# Patient Record
Sex: MALE | Race: WHITE | Employment: OTHER | URBAN - METROPOLITAN AREA
[De-identification: names, ages, dates, MRNs, and addresses within clinical notes are randomized per-mention and may not be internally consistent; named-entity substitution may affect disease eponyms.]

---

## 2018-10-12 RX ORDER — POTASSIUM CHLORIDE 750 MG/1
10 TABLET, EXTENDED RELEASE ORAL 2 TIMES DAILY
Qty: 90 TAB | Refills: 0 | OUTPATIENT
Start: 2018-10-12

## 2018-10-12 NOTE — TELEPHONE ENCOUNTER
[facility] .  No lab > 1 year, Hema Murphy

## 2018-12-12 ENCOUNTER — OFFICE VISIT (OUTPATIENT)
Dept: FAMILY MEDICINE CLINIC | Age: 78
End: 2018-12-12

## 2018-12-12 VITALS
DIASTOLIC BLOOD PRESSURE: 80 MMHG | HEART RATE: 63 BPM | OXYGEN SATURATION: 99 % | HEIGHT: 74 IN | SYSTOLIC BLOOD PRESSURE: 147 MMHG | BODY MASS INDEX: 27.72 KG/M2 | WEIGHT: 216 LBS | TEMPERATURE: 97.7 F | RESPIRATION RATE: 16 BRPM

## 2018-12-12 DIAGNOSIS — E78.00 HYPERCHOLESTEREMIA: Primary | ICD-10-CM

## 2018-12-12 DIAGNOSIS — N18.9 CHRONIC KIDNEY DISEASE, UNSPECIFIED CKD STAGE: ICD-10-CM

## 2018-12-12 RX ORDER — TADALAFIL 5 MG/1
2.5 TABLET ORAL
COMMUNITY
End: 2018-12-12

## 2018-12-12 RX ORDER — DILTIAZEM HYDROCHLORIDE 180 MG/1
CAPSULE, COATED, EXTENDED RELEASE ORAL
Refills: 2 | COMMUNITY
Start: 2018-10-27 | End: 2020-08-24

## 2018-12-12 RX ORDER — CIPROFLOXACIN 500 MG/1
TABLET ORAL
COMMUNITY
Start: 2018-05-28 | End: 2018-12-12

## 2018-12-12 RX ORDER — RIVAROXABAN 20 MG/1
TABLET, FILM COATED ORAL
Refills: 7 | COMMUNITY
Start: 2018-11-22

## 2018-12-12 RX ORDER — SILDENAFIL 50 MG/1
50 TABLET, FILM COATED ORAL AS NEEDED
COMMUNITY
End: 2019-03-25 | Stop reason: DRUGHIGH

## 2018-12-12 RX ORDER — POTASSIUM CHLORIDE 750 MG/1
CAPSULE, EXTENDED RELEASE ORAL
Refills: 0 | COMMUNITY
Start: 2018-10-16 | End: 2018-12-12 | Stop reason: SDUPTHER

## 2018-12-12 RX ORDER — MELOXICAM 15 MG/1
TABLET ORAL
Refills: 1 | COMMUNITY
Start: 2018-10-11 | End: 2019-03-25 | Stop reason: ALTCHOICE

## 2018-12-12 RX ORDER — TAMSULOSIN HYDROCHLORIDE 0.4 MG/1
0.8 CAPSULE ORAL DAILY
COMMUNITY
End: 2018-12-12

## 2018-12-12 RX ORDER — POTASSIUM CHLORIDE 750 MG/1
10 CAPSULE, EXTENDED RELEASE ORAL 2 TIMES DAILY
Qty: 180 CAP | Refills: 1 | Status: SHIPPED | OUTPATIENT
Start: 2018-12-12

## 2018-12-12 RX ORDER — SIMVASTATIN 10 MG/1
TABLET, FILM COATED ORAL
Qty: 90 TAB | Refills: 1 | Status: SHIPPED | OUTPATIENT
Start: 2018-12-12 | End: 2019-02-24 | Stop reason: SDUPTHER

## 2018-12-12 RX ORDER — UREA 10 %
50 LOTION (ML) TOPICAL
COMMUNITY
End: 2020-08-24

## 2018-12-12 RX ORDER — SIMVASTATIN 10 MG/1
TABLET, FILM COATED ORAL
Refills: 0 | COMMUNITY
Start: 2018-11-10 | End: 2018-12-12 | Stop reason: SDUPTHER

## 2018-12-12 RX ORDER — PNEUMOCOCCAL 13-VALENT CONJUGATE VACCINE 2.2; 2.2; 2.2; 2.2; 2.2; 4.4; 2.2; 2.2; 2.2; 2.2; 2.2; 2.2; 2.2 UG/.5ML; UG/.5ML; UG/.5ML; UG/.5ML; UG/.5ML; UG/.5ML; UG/.5ML; UG/.5ML; UG/.5ML; UG/.5ML; UG/.5ML; UG/.5ML; UG/.5ML
INJECTION, SUSPENSION INTRAMUSCULAR
Refills: 0 | COMMUNITY
Start: 2018-09-18 | End: 2020-08-24

## 2018-12-12 RX ORDER — CHLORPHENIRAMINE MALEATE 4 MG
TABLET ORAL
COMMUNITY
End: 2020-08-24

## 2018-12-12 NOTE — PROGRESS NOTES
[address] [facility] [address] [facility], [address] [place] [address]  Dr. Kevon Jara. [place] Covert  Phone:  [phone]  Fax:  [phone]    Progress Note    Name:  Chico Deras  Age:  69 y.o.  :  1940  Encounter Date:  2018    Primary Care Provider:  De Delgado MD    Chief Complaint   Patient presents with    Medication Refill     needs lab work for potassium to get refilled, patient states he has been taking otc potassium since he had run out of his prescription      HPI:  Patient here to follow-up potassium and determine medication refill and adjustments and appropriate lab evaluation. Patient is compliant with medications and no new side effects.        Past Medical History:   Diagnosis Date    A-fib [facility])     controlled with meds    Asbestosis ([address]     Bladder stone     BPH (benign prostatic hyperplasia)     Chronic kidney disease     stage 3    Diverticulosis     Gallstone     HTN (hypertension)     Hyperlipidemia     Lipoma     Osteoarthritis     Pes planus     Renal stones      Past Surgical History:   Procedure Laterality Date    HX COLONOSCOPY      sigmoid diverticulosis, repeat     HX HERNIA REPAIR      HX ORTHOPAEDIC      re-attachment of right middle finger post injury      Family History   Problem Relation Age of Onset    Coronary Artery Disease Mother     Heart Attack Mother     Prostate Cancer Father     Alcohol abuse Brother     Other Brother         colonic polyps     Social History     Socioeconomic History    Marital status:      Spouse name: Not on file    Number of children: Not on file    Years of education: Not on file    Highest education level: Not on file   Tobacco Use    Smoking status: Former Smoker     Types: Cigarettes    Smokeless tobacco: Never Used    Tobacco comment: quit >35years ago   Substance and Sexual Activity    Alcohol use: Yes     Frequency: Monthly or less     Comment: socially    Drug use: No    Sexual activity: Yes     Partners: Female       Current Outpatient Medications   Medication Sig Dispense Refill    FLUZONE HIGH-DOSE 2018-19, PF, syrg injection inject 0.5 milliliter intramuscularly  0    meloxicam (MOBIC) 15 mg tablet   1    PREVNAR 13, PF, 0.5 mL syrg injection inject 0.5 milliliter intramuscularly  0    dilTIAZem CD (CARDIZEM CD) 180 mg ER capsule TK 1 C PO QD  2    XARELTO 20 mg tab tablet TK 1 T PO QD WITH QUINTIN MEAL  7    multivitamin (MULTIPLE VITAMINS PO) Take  by mouth.  omega-3 fatty acids-fish oil (FISH OIL) 360-1,200 mg cap Take  by mouth.  thiamine (B-1) 50 mg tablet Take 50 mg by mouth.  sildenafil citrate (VIAGRA) 50 mg tablet Take 50 mg by mouth as needed.  potassium chloride SA (MICRO-K) 10 mEq capsule Take 1 Cap by mouth two (2) times a day. 180 Cap 1    simvastatin (ZOCOR) 10 mg tablet TK 1 T PO QHS 90 Tab 1     No Known Allergies  Patient Active Problem List   Diagnosis Code    HTN (hypertension) I10       Review of Systems   Constitutional: Negative for fever. Respiratory: Negative for shortness of breath. Cardiovascular: Negative for chest pain, orthopnea and PND. Gastrointestinal: Negative for abdominal pain, nausea and vomiting. Visit Vitals  /80 (BP 1 Location: Left arm, BP Patient Position: Sitting)   Pulse 63   Temp 97.7 °F (36.5 °C) (Oral)   Resp 16   Ht 6' 2\" (1.88 m)   Wt 216 lb (98 kg)   SpO2 99%   BMI 27.73 kg/m²     Physical Exam   Constitutional: He is oriented to person, place, and time and well-developed, well-nourished, and in no distress. HENT:   Head: Normocephalic. Eyes: Pupils are equal, round, and reactive to light. Neck: Normal range of motion. Cardiovascular: Normal rate and regular rhythm. Pulmonary/Chest: Effort normal and breath sounds normal.   Abdominal: Soft. Neurological: He is alert and oriented to person, place, and time. Skin: Skin is warm and dry.        Labs/Radiology Reviewed    Assessment/Plan:    ICD-10-CM ICD-9-CM    1. Hypercholesteremia E78.00 272.0 simvastatin (ZOCOR) 10 mg tablet   2. Chronic kidney disease, unspecified CKD stage N18.9 585.9 potassium chloride SA (MICRO-K) 10 mEq capsule      METABOLIC PANEL, BASIC       Orders Placed This Encounter    METABOLIC PANEL, BASIC    potassium chloride SA (MICRO-K) 10 mEq capsule    simvastatin (ZOCOR) 10 mg tablet       Follow-up Disposition:  Return in about 3 months (around 3/12/2019) for annual exam with lab work.       Madi Og MD  12/12/2018

## 2018-12-12 NOTE — PROGRESS NOTES
Identified pt with two pt identifiers(name and ). Chief Complaint   Patient presents with    Medication Refill     needs lab work for potassium to get refilled         Health Maintenance Due   Topic    DTaP/Tdap/Td series (1 - Tdap)    Shingrix Vaccine Age 50> (1 of 2)    GLAUCOMA SCREENING Q2Y     Pneumococcal 65+ Low/Medium Risk (1 of 2 - PCV13)    MEDICARE YEARLY EXAM     Influenza Age 5 to Adult        Wt Readings from Last 3 Encounters:   18 216 lb (98 kg)     Temp Readings from Last 3 Encounters:   No data found for Temp     BP Readings from Last 3 Encounters:   No data found for BP     Pulse Readings from Last 3 Encounters:   No data found for Pulse         Learning Assessment:  :     No flowsheet data found. Depression Screening:  :     No flowsheet data found. Fall Risk Assessment:  :     No flowsheet data found. Abuse Screening:  :     No flowsheet data found. Coordination of Care Questionnaire:  :     1) Have you been to an emergency room, urgent care clinic since your last visit? no   Hospitalized since your last visit? no             2) Have you seen or consulted any other health care providers outside of [address] [place] [address] since your last visit? yes 30 day supply of potassium. saw a doctor in [place] to get  (Include any pap smears or colon screenings in this section.)    3) Do you have an Advance Directive on file? no  Are you interested in receiving information about Advance Directives? no    Patient is accompanied by self I have received verbal consent from Patricia Braswell to discuss any/all medical information while they are present in the room. Reviewed record in preparation for visit and have obtained necessary documentation. Medication reconciliation up to date and corrected with patient at this time.

## 2018-12-13 LAB
BUN SERPL-MCNC: 18 MG/DL (ref 8–27)
BUN/CREAT SERPL: 17 (ref 10–24)
CALCIUM SERPL-MCNC: 9.2 MG/DL (ref 8.6–10.2)
CHLORIDE SERPL-SCNC: 105 MMOL/L (ref 96–106)
CO2 SERPL-SCNC: 26 MMOL/L (ref 20–29)
CREAT SERPL-MCNC: 1.03 MG/DL (ref 0.76–1.27)
GLUCOSE SERPL-MCNC: 109 MG/DL (ref 65–99)
POTASSIUM SERPL-SCNC: 3.7 MMOL/L (ref 3.5–5.2)
SODIUM SERPL-SCNC: 148 MMOL/L (ref 134–144)

## 2019-03-25 ENCOUNTER — OFFICE VISIT (OUTPATIENT)
Dept: FAMILY MEDICINE CLINIC | Age: 79
End: 2019-03-25

## 2019-03-25 VITALS
HEIGHT: 70 IN | DIASTOLIC BLOOD PRESSURE: 80 MMHG | RESPIRATION RATE: 16 BRPM | SYSTOLIC BLOOD PRESSURE: 118 MMHG | TEMPERATURE: 97.8 F | OXYGEN SATURATION: 96 % | BODY MASS INDEX: 29.92 KG/M2 | HEART RATE: 88 BPM | WEIGHT: 209 LBS

## 2019-03-25 DIAGNOSIS — Z00.00 ANNUAL PHYSICAL EXAM: Primary | ICD-10-CM

## 2019-03-25 DIAGNOSIS — N52.39 OTHER POST-PROCEDURAL ERECTILE DYSFUNCTION: ICD-10-CM

## 2019-03-25 PROBLEM — E78.5 DYSLIPIDEMIA: Status: ACTIVE | Noted: 2019-03-25

## 2019-03-25 PROBLEM — N52.9 ERECTILE DYSFUNCTION: Status: ACTIVE | Noted: 2019-03-25

## 2019-03-25 PROBLEM — I48.20 CHRONIC ATRIAL FIBRILLATION (HCC): Status: ACTIVE | Noted: 2019-03-25

## 2019-03-25 PROBLEM — N18.30 CKD (CHRONIC KIDNEY DISEASE) STAGE 3, GFR 30-59 ML/MIN (HCC): Status: ACTIVE | Noted: 2019-03-25

## 2019-03-25 PROBLEM — J61 ASBESTOSIS (HCC): Status: ACTIVE | Noted: 2019-03-25

## 2019-03-25 RX ORDER — SILDENAFIL CITRATE 20 MG/1
20 TABLET ORAL AS NEEDED
Qty: 90 TAB | Refills: 1 | Status: SHIPPED | OUTPATIENT
Start: 2019-03-25 | End: 2020-06-08

## 2019-03-25 NOTE — PROGRESS NOTES
Date of visit: 3/25/2019 This is a Subsequent Medicare Annual Wellness Visit (AWV), (Performed more than 12 months after effective date of Medicare Part B enrollment and 12 months after last preventive visit, Once in a lifetime) I have reviewed the patient's medical history in detail and updated the computerized patient record. Nilesh Scanlon is a 69 y.o. male History obtained from: the patient. Concerns today (Patient understands that medical problems addressed today may incur additional cost as this is a preventive visit) -No concerns. ED Patient states that he currently on Viagra. Takes it about once per month. Usually takes 3-4 20mg tablets as needed. Reports early morning erections. History Patient Active Problem List  
Diagnosis Code  
 HTN (hypertension) I10  
 Erectile dysfunction N52.9  Chronic atrial fibrillation (HCC) I48.2  Asbestosis ([address] J61  
 CKD (chronic kidney disease) stage 3, GFR 30-59 ml/min (HCC) N18.3  Dyslipidemia E78.5 Past Medical History:  
Diagnosis Date  A-fib ([address]   
 controlled with meds  Asbestosis ([address]  Bladder stone  BPH (benign prostatic hyperplasia)  Chronic kidney disease   
 stage 3  Diverticulosis  Gallstone  HTN (hypertension)  Hyperlipidemia  Lipoma  Osteoarthritis  Pes planus  Renal stones Past Surgical History:  
Procedure Laterality Date  HX COLONOSCOPY  2012  
 sigmoid diverticulosis, repeat 2022  HX HERNIA REPAIR    
 HX ORTHOPAEDIC    
 re-attachment of right middle finger post injury No Known Allergies Current Outpatient Medications Medication Sig Dispense Refill  pneumococcal 23-madison ps vaccine (PNEUMOVAX 23) 25 mcg/0.5 mL injection 0.5 mL by IntraMUSCular route once for 1 dose. 0.5 mL 0  
 varicella-zoster recombinant, PF, (SHINGRIX, PF,) 50 mcg/0.5 mL susr injection 0.5mL by IntraMUSCular route once now and then repeat in 2-6 months 0.5 mL 1  sildenafil, antihypertensive, (REVATIO) 20 mg tablet Take 1 Tab by mouth as needed for Other (ED). Take 1-5 tablets as needed 90 Tab 1  
 simvastatin (ZOCOR) 10 mg tablet TAKE 1 TABLET BY MOUTH EVERY NIGHT AT BEDTIME 90 Tab 1  
 FLUZONE HIGH-DOSE 2018-19, PF, syrg injection inject 0.5 milliliter intramuscularly  0  
 PREVNAR 13, PF, 0.5 mL syrg injection inject 0.5 milliliter intramuscularly  0  
 dilTIAZem CD (CARDIZEM CD) 180 mg ER capsule TK 1 C PO QD  2  
 XARELTO 20 mg tab tablet TK 1 T PO QD WITH QUINTIN MEAL  7  
 multivitamin (MULTIPLE VITAMINS PO) Take  by mouth.  omega-3 fatty acids-fish oil (FISH OIL) 360-1,200 mg cap Take  by mouth.  thiamine (B-1) 50 mg tablet Take 50 mg by mouth.  potassium chloride SA (MICRO-K) 10 mEq capsule Take 1 Cap by mouth two (2) times a day. 180 Cap 1 Family History Problem Relation Age of Onset  Coronary Artery Disease Mother  Heart Attack Mother  Prostate Cancer Father  Alcohol abuse Brother  Other Brother   
     colonic polyps Social History Tobacco Use  Smoking status: Former Smoker Types: Cigarettes  Smokeless tobacco: Never Used  Tobacco comment: quit >35years ago Substance Use Topics  Alcohol use: Yes Frequency: Monthly or less Comment: socially Specialists/Care Team  
Rikki Calvert. Ricky Tinoco has established care with the following healthcare providers: 
Patient Care Team: 
Leonila Parr MD as PCP - [facility]) Dr. Guicho Ford- Pulmonology Health Risk Assessment Demographics  
male 
69 y.o. General Health Questions  
-During the past 4 weeks: 
 -how would you rate your health in general? Good  
 -how often have you been bothered by feeling dizzy when standing up? never  -how much have you been bothered by bodily pain? mildly 
 -Have you noticed any hearing difficulties? no 
 -has your physical and emotional health limited your social activities with family or friends? no 
 
Emotional Health Questions  
-Do you have a history of depression, anxiety, or emotional problems? no 
-Over the past 2 weeks, have you felt down, depressed or hopeless? no 
-Over the past 2 weeks, have you felt little interest or pleasure in doing things? no 
 
Health Habits Please describe your diet habits: Trying to eat a well balanced diet. Do you get 5 servings of fruits or vegetables daily? yes Do you exercise regularly? Yes, plays golf daily. Activities of Daily Living and Functional Status  
-Do you need help with eating, walking, dressing, bathing, toileting, the phone, transportation, shopping, preparing meals, housework, laundry, medications or managing money? no 
-In the past four weeks, was someone available to help you if you needed and wanted help with anything? no 
-Are you confident are you that you can control and manage most of your health problems? yes 
-Have you been given information to help you keep track of your medications? yes 
-How often do you have trouble taking your medications as prescribed? never Fall Risk and Home Safety Have you fallen 2 or more times in the past year? no 
Does your home have rugs in the hallway, lack grab bars in the bathroom, lack handrails on the stairs or have poor lighting? no 
Do you have smoke detectors and check them regularly? yes Do you have difficulties driving a car? no 
Do you always fasten your seat belt when you are in a car? yes Review of Systems (if indicated for problems addressed today) ROS: (positive in bold) General: wt loss, fever, fatigue or appetite change Skin: rashes or suspicious skin lesions HEENT: changes in vision, dysphagia, congestion, sore throat Cardiac: chest pain, palpitations, CAREY,edema Pul: SOB, dyspnea GI: abdominal pain, N&V, diarrhea, constipation, hematemsis, melena,  
: hematuria, dysuria, freq, urgency, incontinence MS: joint pain, swelling, stiffness, myalgia, back pain Neuro: weakness, parasthesias, headache, syncope Psych: anxiety, depression Physical Examination Vitals:  
 03/25/19 1342 BP: 118/80 Pulse: 88 Resp: 16 Temp: 97.8 °F (36.6 °C) TempSrc: Oral  
SpO2: 96% Weight: 209 lb (94.8 kg) Height: 5' 10\" (1.778 m) Body mass index is 29.99 kg/m². No exam data present Gen:  Well developed, well nourished male in no acute distress HEENT: normocephalic/atraumatic; PERRL; TM intact, translucent, and neutral BL;  oropharynx shows no erythema or exudates Skin:  No rashes or suspicious skin lesions noted Neck:   Supple, no lympadenopathy, no thyromegaly Card:  RRR, no m/r/g Chest:  CTAB, no w/r/r Abd:  BS+, Soft, nontender/nondistended Extr:  2+ pulses BL, no LE edema MS:   full ROM, 5/5 strength BL, sensation intact Neuro: AAO X 3, CN II-XII grossly intact Psych:  Nl mood and affect Evaluation of Cognitive Function Mood/affect:  neutral 
Orientation: Person, Place, Time and Situation Appearance: age appropriate Advice/Referrals/Counseling (as indicated) Education and counseling provided for any problems identified above Preventive Services (Preventive care checklist to be included in patient instructions) Discussed today Done Previously Not Needed X X  Pneumococcal vaccines X X X Flu vaccine X X  Shingles vaccine X X  TDAP vaccine X X X PSA X 6/22/12 X Colorectal cancer screening X X quit 40 years ago X Low-dose CT for lung cancer screening X X X Glaucoma screening X X X Cholesterol test  
X X X Diabetes screening test   
 
Discussion of Advance Directive Discussed with Rikki Calvert. Ricky Devoid his ability to prepare and advance directive in the case that an injury or illness causes him to be unable to make health care decisions. Assessment/Plan ICD-10-CM ICD-9-CM 1.  Annual physical exam Z00.00 V70.0 pneumococcal 23-madison ps vaccine (PNEUMOVAX 23) 25 mcg/0.5 mL injection  
   varicella-zoster recombinant, PF, (SHINGRIX, PF,) 50 mcg/0.5 mL susr injection METABOLIC PANEL, BASIC  
   LIPID PANEL  
   HEMOGLOBIN A1C WITH EAG 2. Other post-procedural erectile dysfunction N52.39 607.84 sildenafil, antihypertensive, (REVATIO) 20 mg tablet Annual 
69 y.o. male who presents today for annual exam. UTD on screening. UTD on influenza and Prevnar 13. Shingrix and pneumovax vaccine provided to patient today. Will check routine labs. Encouraged daily exercise and trying to eat a well balanced diet. ED Stable. Well controlled on Sildenafil. Not on nitrates. Discussed risk and benefits of being on medication. Patient follows urology and cardiology. Will continue to monitor. I have discussed the diagnosis with the patient and the intended plan as seen in the above orders. The patient has received an after-visit summary and questions were answered concerning future plans. I have discussed medication side effects and warnings with the patient as well. The patient agrees and understands above plan. Follow-up and Dispositions · Return in about 1 year (around 3/25/2020). Patient discussed with supervising attending.   
 
Anthony Turner,

## 2019-03-25 NOTE — PROGRESS NOTES
Identified pt with two pt identifiers(name and ). Chief Complaint Patient presents with [facility] Annual Wellness Visit [facility] wellness exam  Health Maintenance Due Topic  DTaP/Tdap/Td series (1 - Tdap)  Shingrix Vaccine Age 50> (1 of 2)  GLAUCOMA SCREENING Q2Y  Pneumococcal 65+ years (1 of 2 - PCV13)  MEDICARE YEARLY EXAM   
 Influenza Age 5 to Adult Wt Readings from Last 3 Encounters:  
19 209 lb (94.8 kg) 18 216 lb (98 kg) Temp Readings from Last 3 Encounters:  
18 97.7 °F (36.5 °C) (Oral) BP Readings from Last 3 Encounters:  
18 147/80 Pulse Readings from Last 3 Encounters:  
18 63 Learning Assessment: 
:  
 
No flowsheet data found. Depression Screening: 
:  
 
3 most recent PHQ Screens 3/25/2019 Little interest or pleasure in doing things Not at all Feeling down, depressed, irritable, or hopeless Not at all Total Score PHQ 2 0 Fall Risk Assessment: 
:  
 
Fall Risk Assessment, last 12 mths 3/25/2019 Able to walk? Yes Fall in past 12 months? No  
 
 
Abuse Screening: 
:  
 
No flowsheet data found. Coordination of Care Questionnaire: 
:  
 
1) Have you been to an emergency room, urgent care clinic since your last visit? no  
Hospitalized since your last visit? no          
 
2) Have you seen or consulted any other health care providers outside of [address] [place] [address] since your last visit? no  (Include any pap smears or colon screenings in this section.) 3) Do you have an Advance Directive on file? no 
Are you interested in receiving information about Advance Directives? no 
 
Patient is accompanied by self I have received verbal consent from [facility] to discuss any/all medical information while they are present in the room. Reviewed record in preparation for visit and have obtained necessary documentation.  
Medication reconciliation up to date and corrected with patient at this time.

## 2019-03-25 NOTE — PATIENT INSTRUCTIONS
Medicare Wellness Visit, Male The best way to live healthy is to have a lifestyle where you eat a well-balanced diet, exercise regularly, limit alcohol use, and quit all forms of tobacco/nicotine, if applicable. Regular preventive services are another way to keep healthy. Preventive services (vaccines, screening tests, monitoring & exams) can help personalize your care plan, which helps you manage your own care. Screening tests can find health problems at the earliest stages, when they are easiest to treat. [facility] follows the current, evidence-based guidelines published by the [facility] Greg Manuela ([facility]) when recommending preventive services for our patients. Because we follow these guidelines, sometimes recommendations change over time as research supports it. (For example, a prostate screening blood test is no longer routinely recommended for men with no symptoms.) Of course, you and your doctor may decide to screen more often for some diseases, based on your risk and co-morbidities (chronic disease you are already diagnosed with). Preventive services for you include: - Medicare offers their members a free annual wellness visit, which is time for you and your primary care provider to discuss and plan for your preventive service needs. Take advantage of this benefit every year! 
-All adults over age 72 should receive the recommended pneumonia vaccines. Current USPSTF guidelines recommend a series of two vaccines for the best pneumonia protection.  
-All adults should have a flu vaccine yearly and an ECG.  All adults age 61 and older should receive a shingles vaccine once in their lifetime.   
-All adults age 38-68 who are overweight should have a diabetes screening test once every three years.  
-Other screening tests & preventive services for persons with diabetes include: an eye exam to screen for diabetic retinopathy, a kidney function test, a foot exam, and stricter control over your cholesterol.  
-Cardiovascular screening for adults with routine risk involves an electrocardiogram (ECG) at intervals determined by the provider.  
-Colorectal cancer screening should be done for adults age 54-65 with no increased risk factors for colorectal cancer. There are a number of acceptable methods of screening for this type of cancer. Each test has its own benefits and drawbacks. Discuss with your provider what is most appropriate for you during your annual wellness visit. The different tests include: colonoscopy (considered the best screening method), a fecal occult blood test, a fecal DNA test, and sigmoidoscopy. 
-All adults born between [facility] should be screened once for Hepatitis C. 
-An Abdominal Aortic Aneurysm (AAA) Screening is recommended for men age 73-68 who has ever smoked in their lifetime.

## 2019-03-27 LAB
BUN SERPL-MCNC: 20 MG/DL (ref 8–27)
BUN/CREAT SERPL: 17 (ref 10–24)
CALCIUM SERPL-MCNC: 9.3 MG/DL (ref 8.6–10.2)
CHLORIDE SERPL-SCNC: 102 MMOL/L (ref 96–106)
CHOLEST SERPL-MCNC: 106 MG/DL (ref 100–199)
CO2 SERPL-SCNC: 25 MMOL/L (ref 20–29)
CREAT SERPL-MCNC: 1.21 MG/DL (ref 0.76–1.27)
EST. AVERAGE GLUCOSE BLD GHB EST-MCNC: 105 MG/DL
GLUCOSE SERPL-MCNC: 98 MG/DL (ref 65–99)
HBA1C MFR BLD: 5.3 % (ref 4.8–5.6)
HDLC SERPL-MCNC: 33 MG/DL
LDLC SERPL CALC-MCNC: 57 MG/DL (ref 0–99)
POTASSIUM SERPL-SCNC: 3.7 MMOL/L (ref 3.5–5.2)
SODIUM SERPL-SCNC: 144 MMOL/L (ref 134–144)
TRIGL SERPL-MCNC: 79 MG/DL (ref 0–149)
VLDLC SERPL CALC-MCNC: 16 MG/DL (ref 5–40)

## 2019-08-26 DIAGNOSIS — E78.00 HYPERCHOLESTEREMIA: ICD-10-CM

## 2019-08-26 RX ORDER — SIMVASTATIN 10 MG/1
TABLET, FILM COATED ORAL
Qty: 90 TAB | Refills: 1 | Status: SHIPPED | OUTPATIENT
Start: 2019-08-26 | End: 2020-02-25

## 2020-05-28 DIAGNOSIS — E78.00 HYPERCHOLESTEREMIA: ICD-10-CM

## 2020-05-28 RX ORDER — SIMVASTATIN 10 MG/1
TABLET, FILM COATED ORAL
Qty: 90 TAB | Refills: 0 | Status: SHIPPED | OUTPATIENT
Start: 2020-05-28 | End: 2020-06-08 | Stop reason: SDUPTHER

## 2020-06-08 ENCOUNTER — VIRTUAL VISIT (OUTPATIENT)
Dept: FAMILY MEDICINE CLINIC | Age: 80
End: 2020-06-08

## 2020-06-08 DIAGNOSIS — L30.9 DERMATITIS: Primary | ICD-10-CM

## 2020-06-08 DIAGNOSIS — E78.00 HYPERCHOLESTEREMIA: ICD-10-CM

## 2020-06-08 RX ORDER — BETAMETHASONE DIPROPIONATE 0.5 MG/G
CREAM TOPICAL 2 TIMES DAILY
Qty: 15 G | Refills: 2 | Status: SHIPPED | OUTPATIENT
Start: 2020-06-08 | End: 2020-08-24 | Stop reason: SDUPTHER

## 2020-06-08 RX ORDER — VITAMIN E 1000 UNIT
1000 CAPSULE ORAL
COMMUNITY

## 2020-06-08 RX ORDER — AMLODIPINE BESYLATE 5 MG/1
TABLET ORAL
COMMUNITY
Start: 2020-05-16

## 2020-06-08 RX ORDER — TADALAFIL 10 MG/1
5 TABLET ORAL AS NEEDED
Qty: 30 TAB | Refills: 3 | Status: SHIPPED | OUTPATIENT
Start: 2020-06-08 | End: 2020-08-24

## 2020-06-08 RX ORDER — SIMVASTATIN 10 MG/1
TABLET, FILM COATED ORAL
Qty: 90 TAB | Refills: 3 | Status: SHIPPED | OUTPATIENT
Start: 2020-06-08 | End: 2020-08-24 | Stop reason: SDUPTHER

## 2020-06-08 RX ORDER — KETOCONAZOLE 20 MG/G
CREAM TOPICAL
COMMUNITY
Start: 2020-04-23

## 2020-06-08 RX ORDER — AMIODARONE HYDROCHLORIDE 200 MG/1
TABLET ORAL
COMMUNITY
Start: 2020-04-11

## 2020-06-08 NOTE — PROGRESS NOTES
Identified pt with two pt identifiers(name and ). Reviewed record in preparation for visit and have obtained necessary documentation. Chief Complaint   Patient presents with    Medication Refill        Health Maintenance Due   Topic    DTaP/Tdap/Td series (1 - Tdap)    GLAUCOMA SCREENING Q2Y     Pneumococcal 65+ years (2 of 2 - PPSV23)    Shingrix Vaccine Age 49> (2 of 2)    Medicare Yearly Exam     Lipid Screen        Coordination of Care Questionnaire:  :   1) Have you been to an emergency room, urgent care, or hospitalized since your last visit? If yes, where when, and reason for visit? no      2. Have seen or consulted any other health care provider since your last visit? If yes, where when, and reason for visit?   no        Patient is accompanied by self  I have received verbal consent from Jaylene Marti to discuss any/all medical information while they are present in the room.

## 2020-06-08 NOTE — PROGRESS NOTES
Wang Vyas is a 67 y.o. male who was seen by synchronous (real-time) audio-video technology on 2020. Consent: Wang Vyas, who was seen by synchronous (real-time) audio-video technology, and/or his healthcare decision maker, is aware that this patient-initiated, Telehealth encounter on 2020 is a billable service, with coverage as determined by his insurance carrier. He is aware that he may receive a bill and has provided verbal consent to proceed: Yes. Assessment & Plan:       I spent at least 15 minutes on this visit with this established patient. ICD-10-CM ICD-9-CM    1. Dermatitis L30.9 692.9 betamethasone dipropionate (DIPROSONE) 0.05 % topical cream   2. Hypercholesteremia E78.00 272.0 simvastatin (ZOCOR) 10 mg tablet     Orders Placed This Encounter    amLODIPine (NORVASC) 5 mg tablet     Sig: TAKE 1 TABLET BY MOUTH ONCE DAILY    amiodarone (CORDARONE) 200 mg tablet     Sig: TAKE 1 TABLET BY MOUTH ONCE DAILY    ascorbic acid, vitamin C, (VITAMIN C) 1,000 mg tablet     Si,000 mg.  ketoconazole (NIZORAL) 2 % topical cream     Sig: APPLY CREAM TOPICALLY TO AFFECTED AREA ONCE DAILY AS NEEDED    simvastatin (ZOCOR) 10 mg tablet     Sig: TAKE 1 TABLET BY MOUTH EVERY DAY AT BEDTIME     Dispense:  90 Tab     Refill:  3     Please schedule appt for exam and lab work    betamethasone dipropionate (DIPROSONE) 0.05 % topical cream     Sig: Apply  to affected area two (2) times a day. Dispense:  15 g     Refill:  2    tadalafiL (Cialis) 10 mg tablet     Sig: Take 1 Tab by mouth as needed for Erectile Dysfunction. Dispense:  30 Tab     Refill:  3     Pt requests generic     Follow-up and Dispositions    · Return in about 3 months (around 2020) for annual exam with lab work. Subjective:   Wang Vyas is a 67 y.o. male who was seen for Medication Refill  Pt is asymptomatic and condition is stable. Medicine refill needed. Pt requests cialsis instead of viagra.  He sees Urology and Cardiology. Prior to Admission medications    Medication Sig Start Date End Date Taking? Authorizing Provider   amLODIPine (NORVASC) 5 mg tablet TAKE 1 TABLET BY MOUTH ONCE DAILY 5/16/20  Yes Provider, Historical   amiodarone (CORDARONE) 200 mg tablet TAKE 1 TABLET BY MOUTH ONCE DAILY 4/11/20  Yes Provider, Historical   ascorbic acid, vitamin C, (VITAMIN C) 1,000 mg tablet 1,000 mg. Yes Provider, Historical   ketoconazole (NIZORAL) 2 % topical cream APPLY CREAM TOPICALLY TO AFFECTED AREA ONCE DAILY AS NEEDED 4/23/20  Yes Provider, Historical   simvastatin (ZOCOR) 10 mg tablet TAKE 1 TABLET BY MOUTH EVERY DAY AT BEDTIME 6/8/20  Yes Sabina Ryan MD   betamethasone dipropionate (DIPROSONE) 0.05 % topical cream Apply  to affected area two (2) times a day. 6/8/20  Yes Sabina Ryan MD   tadalafiL (Cialis) 10 mg tablet Take 1 Tab by mouth as needed for Erectile Dysfunction. 6/8/20  Yes Sabina Ryan MD   varicella-zoster recombinant, PF, [facility], PF,) 50 mcg/0.5 mL susr injection 0.5mL by IntraMUSCular route once now and then repeat in 2-6 months 3/25/19  Yes Paolo Barbosa, DO   FLUZONE HIGH-DOSE 2018-19, PF, syrg injection inject 0.5 milliliter intramuscularly 9/18/18  Yes Provider, Historical   dilTIAZem CD (CARDIZEM CD) 180 mg ER capsule TK 1 C PO QD 10/27/18  Yes Provider, Historical   XARELTO 20 mg tab tablet TK 1 T PO QD WITH QUINTIN MEAL 11/22/18  Yes Provider, Historical   multivitamin (MULTIPLE VITAMINS PO) Take  by mouth. Yes Provider, Historical   omega-3 fatty acids-fish oil (FISH OIL) 360-1,200 mg cap Take  by mouth. Yes Provider, Historical   thiamine (B-1) 50 mg tablet Take 50 mg by mouth. Yes Provider, Historical   potassium chloride SA (MICRO-K) 10 mEq capsule Take 1 Cap by mouth two (2) times a day.  12/12/18  Yes Sabina Ryan MD   simvastatin (ZOCOR) 10 mg tablet TAKE 1 TABLET BY MOUTH EVERY DAY AT BEDTIME 5/28/20 6/8/20  Cirilo Crane MD   sildenafil, antihypertensive, (REVATIO) 20 mg tablet Take 1 Tab by mouth as needed for Other (ED). Take 1-5 tablets as needed 3/25/19 6/8/20  DO JUAN RAMON RochaNAR 13, PF, 0.5 mL syrg injection inject 0.5 milliliter intramuscularly 9/18/18   Provider, Historical     No Known Allergies        Review of Systems   Constitutional: Negative for fever. Respiratory: Negative for shortness of breath. Cardiovascular: Negative for chest pain, orthopnea and PND. Gastrointestinal: Negative for abdominal pain, nausea and vomiting. Objective:   Vital Signs: (As obtained by patient/caregiver at home)  There were no vitals taken for this visit.        Constitutional: [x] Appears well-developed and well-nourished [x] No apparent distress      [] Abnormal -     Mental status: [x] Alert and awake  [x] Oriented to person/place/time [x] Able to follow commands    [] Abnormal -     Eyes:   EOM    [x]  Normal    [] Abnormal -   Sclera  [x]  Normal    [] Abnormal -          Discharge []  None visible   [] Abnormal -     HENT: [x] Normocephalic, atraumatic  [] Abnormal -   [] Mouth/Throat: Mucous membranes are moist    External Ears [x] Normal  [] Abnormal -    Neck: [x] No visualized mass [] Abnormal -     Pulmonary/Chest: [x] Respiratory effort normal   [x] No visualized signs of difficulty breathing or respiratory distress        [] Abnormal -      Musculoskeletal:   [x] Normal gait with no signs of ataxia         [] Normal range of motion of neck        [] Abnormal -     Neurological:        [x] No Facial Asymmetry (Cranial nerve 7 motor function) (limited exam due to video visit)          [x] No gaze palsy        [] Abnormal -          Skin:        [x] No significant exanthematous lesions or discoloration noted on facial skin         [] Abnormal -            Psychiatric:       [x] Normal Affect [] Abnormal -        [] No Hallucinations    Other pertinent observable physical exam findings:-        We discussed the expected course, resolution and complications of the diagnosis(es) in detail. Medication risks, benefits, costs, interactions, and alternatives were discussed as indicated. I advised him to contact the office if his condition worsens, changes or fails to improve as anticipated. He expressed understanding with the diagnosis(es) and plan. Troy Balderas is a 67 y.o. male who was evaluated by a video visit encounter for concerns as above. Patient identification was verified prior to start of the visit. A caregiver was present when appropriate. Due to this being a TeleHealth encounter (During [place] public health emergency), evaluation of the following organ systems was limited: Vitals/Constitutional/EENT/Resp/CV/GI//MS/Neuro/Skin/Heme-Lymph-Imm. Pursuant to the emergency declaration under the [facility], 1135 waiver authority and the [email or web address] and [employer] Act, this Virtual  Visit was conducted, with patient's (and/or legal guardian's) consent, to reduce the patient's risk of exposure to COVID-19 and provide necessary medical care. Services were provided through a video synchronous discussion virtually to substitute for in-person clinic visit. Pt is at his home and I am in my office.     Alejandra Craft MD

## 2020-08-24 ENCOUNTER — OFFICE VISIT (OUTPATIENT)
Dept: FAMILY MEDICINE CLINIC | Age: 80
End: 2020-08-24
Payer: MEDICARE

## 2020-08-24 VITALS
HEART RATE: 65 BPM | TEMPERATURE: 97.9 F | BODY MASS INDEX: 28.31 KG/M2 | DIASTOLIC BLOOD PRESSURE: 75 MMHG | OXYGEN SATURATION: 95 % | RESPIRATION RATE: 16 BRPM | SYSTOLIC BLOOD PRESSURE: 138 MMHG | WEIGHT: 209 LBS | HEIGHT: 72 IN

## 2020-08-24 DIAGNOSIS — N52.9 ERECTILE DYSFUNCTION, UNSPECIFIED ERECTILE DYSFUNCTION TYPE: Primary | ICD-10-CM

## 2020-08-24 DIAGNOSIS — L30.9 DERMATITIS: ICD-10-CM

## 2020-08-24 DIAGNOSIS — Z00.00 ANNUAL PHYSICAL EXAM: ICD-10-CM

## 2020-08-24 DIAGNOSIS — E78.00 HYPERCHOLESTEREMIA: ICD-10-CM

## 2020-08-24 PROCEDURE — 3288F FALL RISK ASSESSMENT DOCD: CPT | Performed by: FAMILY MEDICINE

## 2020-08-24 PROCEDURE — G8754 DIAS BP LESS 90: HCPCS | Performed by: FAMILY MEDICINE

## 2020-08-24 PROCEDURE — 99213 OFFICE O/P EST LOW 20 MIN: CPT | Performed by: FAMILY MEDICINE

## 2020-08-24 PROCEDURE — G8752 SYS BP LESS 140: HCPCS | Performed by: FAMILY MEDICINE

## 2020-08-24 PROCEDURE — G0439 PPPS, SUBSEQ VISIT: HCPCS | Performed by: FAMILY MEDICINE

## 2020-08-24 PROCEDURE — G8536 NO DOC ELDER MAL SCRN: HCPCS | Performed by: FAMILY MEDICINE

## 2020-08-24 PROCEDURE — G8510 SCR DEP NEG, NO PLAN REQD: HCPCS | Performed by: FAMILY MEDICINE

## 2020-08-24 PROCEDURE — 1100F PTFALLS ASSESS-DOCD GE2>/YR: CPT | Performed by: FAMILY MEDICINE

## 2020-08-24 PROCEDURE — G8427 DOCREV CUR MEDS BY ELIG CLIN: HCPCS | Performed by: FAMILY MEDICINE

## 2020-08-24 PROCEDURE — G8419 CALC BMI OUT NRM PARAM NOF/U: HCPCS | Performed by: FAMILY MEDICINE

## 2020-08-24 RX ORDER — METOPROLOL SUCCINATE 25 MG/1
TABLET, EXTENDED RELEASE ORAL
COMMUNITY
Start: 2020-06-30

## 2020-08-24 RX ORDER — BETAMETHASONE DIPROPIONATE 0.5 MG/G
CREAM TOPICAL 2 TIMES DAILY
Qty: 15 G | Refills: 2 | Status: SHIPPED | OUTPATIENT
Start: 2020-08-24

## 2020-08-24 RX ORDER — SILDENAFIL CITRATE 20 MG/1
TABLET ORAL
Qty: 100 TAB | Refills: 1 | Status: SHIPPED | OUTPATIENT
Start: 2020-08-24 | End: 2020-12-15

## 2020-08-24 RX ORDER — SIMVASTATIN 10 MG/1
TABLET, FILM COATED ORAL
Qty: 90 TAB | Refills: 3 | Status: SHIPPED | OUTPATIENT
Start: 2020-08-24 | End: 2020-12-14 | Stop reason: SDUPTHER

## 2020-08-24 RX ORDER — CHLORPHENIRAMINE MALEATE 4 MG
1200 TABLET ORAL
COMMUNITY

## 2020-08-24 NOTE — PROGRESS NOTES
Date of visit: 8/24/2020       This is a Subsequent Medicare Annual Wellness Visit (AWV), (Performed more than 12 months after effective date of Medicare Part B enrollment and 12 months after last preventive visit, Once in a lifetime)    I have reviewed the patient's medical history in detail and updated the computerized patient record.      Cyndee Tejeda is a 79 y.o. male   History obtained from: patient    Concerns today   (Patient understands that medical problems addressed today may incur additional cost as this is a preventive visit)    History     Patient Active Problem List   Diagnosis Code    HTN (hypertension) I10    Erectile dysfunction N52.9    Chronic atrial fibrillation (HCC) I48.20    Asbestosis ([address] J61    CKD (chronic kidney disease) stage 3, GFR 30-59 ml/min (HCC) N18.3    Dyslipidemia E78.5     Past Medical History:   Diagnosis Date    A-fib [facility]) 2020 Dr. Yasmin Shepherd    controlled with meds    Asbestosis [facility]) 2020 Dr. Nikolai Hendrix Bladder stone     BPH (benign prostatic hyperplasia) 2020 Dr. Mulligan Headings Chronic kidney disease     stage 3    Diverticulosis     Gallstone     HTN (hypertension)     Hyperlipidemia     Lipoma     Osteoarthritis     Pes planus     Renal stones       Past Surgical History:   Procedure Laterality Date    HX COLONOSCOPY  2012    sigmoid diverticulosis, repeat 2022    HX HERNIA REPAIR      HX ORTHOPAEDIC      re-attachment of right middle finger post injury      Allergies   Allergen Reactions    Milk Itching     Current Outpatient Medications   Medication Sig Dispense Refill    omega-3 fatty acids-fish oil 360-1,200 mg cap 1,200 mg.      metoprolol succinate (TOPROL-XL) 25 mg XL tablet TAKE 1 TABLET BY MOUTH ONCE DAILY      simvastatin (ZOCOR) 10 mg tablet TAKE 1 TABLET BY MOUTH EVERY DAY AT BEDTIME 90 Tab 3    sildenafiL, pulmonary hypertension, (REVATIO) 20 mg tablet 1-5 tabs I-2 hours prior to intercourse 100 Tab 1    betamethasone dipropionate (DIPROSONE) 0.05 % topical cream Apply  to affected area two (2) times a day. 15 g 2    amLODIPine (NORVASC) 5 mg tablet TAKE 1 TABLET BY MOUTH ONCE DAILY      amiodarone (CORDARONE) 200 mg tablet TAKE 1 TABLET BY MOUTH ONCE DAILY      ascorbic acid, vitamin C, (VITAMIN C) 1,000 mg tablet 1,000 mg.  ketoconazole (NIZORAL) 2 % topical cream APPLY CREAM TOPICALLY TO AFFECTED AREA ONCE DAILY AS NEEDED      XARELTO 20 mg tab tablet TK 1 T PO QD WITH QUINTIN MEAL  7    multivitamin (MULTIPLE VITAMINS PO) Take  by mouth.  potassium chloride SA (MICRO-K) 10 mEq capsule Take 1 Cap by mouth two (2) times a day. 80 Cap 1     Family History   Problem Relation Age of Onset    Coronary Artery Disease Mother     Heart Attack Mother     Prostate Cancer Father     Alcohol abuse Brother     Other Brother         colonic polyps     Social History     Tobacco Use    Smoking status: Former Smoker     Types: Cigarettes    Smokeless tobacco: Never Used    Tobacco comment: quit >35years ago   Substance Use Topics    Alcohol use: Yes     Frequency: Monthly or less     Comment: socially       Specialists/Care Team   Vikram Dill has established care with the following healthcare providers:  Patient Care Team:  Brooke Lee MD as PCP - General (Family Medicine)    Health Risk Assessment     Demographics   male  79 y.o. General Health Questions   -During the past 4 weeks:   -how would you rate your health in general? Good       Emotional Health Questions   -Do you have a history of depression, anxiety, or emotional problems? no  -Over the past 2 weeks, have you felt down, depressed or hopeless? no      Health Habits   Please describe your diet habits: Regular diet     Do you exercise regularly?  yes    Activities of Daily Living and Functional Status   -Do you need help with eating, walking, dressing, bathing, toileting, the phone, transportation, shopping, preparing meals, housework, laundry, medications or managing money? no  -Are you confident are you that you can control and manage most of your health problems? yes  -How often do you have trouble taking your medications as prescribed? never    Fall Risk and Home Safety   Have you fallen 2 or more times in the past year? no  Does your home have rugs in the hallway, lack grab bars in the bathroom, lack handrails on the stairs or have poor lighting? no  Do you have smoke detectors and check them regularly? yes  Do you have difficulties driving a car? no  Do you always fasten your seat belt when you are in a car? yes    Review of Systems (if indicated for problems addressed today)   General/Constitutional:  No headache, fever, fatigue, weight loss or weight gain     Eyes:  No redness, pruritis, pain, visual changes, swelling, or discharge    Ears:  No pain, loss or changes in hearin    Neck:  No swelling, masses, stiffness, pain, or limited movemen    Cardiac:   No chest pain    Respiratory:  No cough or shortness of breath    GI:  No nausea/vomiting, diarrhea, abdominal pain, bloody or dark stools     :  No dysuria or  hematuria     Physical Examination     Vitals:    08/24/20 0914   BP: 138/75   Pulse: 65   Resp: 16   Temp: 97.9 °F (36.6 °C)   TempSrc: Oral   SpO2: 95%   Weight: 209 lb (94.8 kg)   Height: 6' (1.829 m)     Body mass index is 28.35 kg/m². No exam data present  Physical Examination: General appearance - alert, well appearing, and in no distress, oriented to person, place, and time and normal appearing weight  Mental status -  normal mood, behavior, speech, dress, motor activity, and thought processes, no expressed suicidal or homicidal ideation, no hallucinations  Nose - normal and patent, no erythema, discharge or polyps  Neck - supple, no significant adenopathy  Chest - normal chest excursion, normal inspiratory and expiratory function. Clear to ausculation bilaterally.     Heart - normal rate, regular rhythm, normal S1, S2, no murmurs, rubs, clicks or gallops, no extremity edema  Abdomen- benign, no organomegaly or masses  - 2020 Urology  Skin-no rashes or suspicious moles  Neuro normal speech, moves all extremities, normal gait  Musculoskeletal- grossly normal joint and motor function. Evaluation of Cognitive Function   Mood/affect: stable  Orientation: Alert and oriented x3  Appearance: appropriate for age and sex        Preventive Services     (Preventive care checklist to be included in patient instructions)  Discussed today Done Previously Not Needed     2020 - Both given  Pneumococcal vaccines    2019  Flu vaccine      Hepatitis B vaccine (if at risk)    2019, needs second dose  Shingles vaccine   x Needs update if cut 2020  TDAP vaccine      Digital rectal exam    2020 - Urology  PSA    2012, repeat in 2022  Colorectal cancer screening      Low-dose CT for lung cancer screening      Bone density test      Glaucoma screening      Cholesterol test      Diabetes screening test       Diabetes self-management class      Nutritionist referral for diabetes or renal disease     Discussion of Advance Directive   Discussed with Hadley Zapata. Anais Thomas his ability to prepare and advance directive in the case that an injury or illness causes him to be unable to make health care decisions:     Assessment/Plan   Z00.00    ICD-10-CM ICD-9-CM    1. Erectile dysfunction, unspecified erectile dysfunction type  N52.9 607.84 sildenafiL, pulmonary hypertension, (REVATIO) 20 mg tablet   2. Hypercholesteremia  E78.00 272.0 simvastatin (ZOCOR) 10 mg tablet   3. Dermatitis  L30.9 692.9 betamethasone dipropionate (DIPROSONE) 0.05 % topical cream   4.  Annual physical exam  Z00.00 V70.0      Pt's lab is ordered by Cardiologist.       Orders Placed This Encounter    omega-3 fatty acids-fish oil 360-1,200 mg cap    metoprolol succinate (TOPROL-XL) 25 mg XL tablet    simvastatin (ZOCOR) 10 mg tablet    sildenafiL, pulmonary hypertension, (REVATIO) 20 mg tablet    betamethasone dipropionate (DIPROSONE) 0.05 % topical cream       Follow-up and Dispositions    · Return in about 1 year (around 8/24/2021) for annual exam with lab work, pt sees Urology, pulmonary and cardiology.  Itzel Cifuentes MD

## 2020-08-24 NOTE — PATIENT INSTRUCTIONS
Medicare Wellness Visit, Male The best way to live healthy is to have a lifestyle where you eat a well-balanced diet, exercise regularly, limit alcohol use, and quit all forms of tobacco/nicotine, if applicable. Regular preventive services are another way to keep healthy. Preventive services (vaccines, screening tests, monitoring & exams) can help personalize your care plan, which helps you manage your own care. Screening tests can find health problems at the earliest stages, when they are easiest to treat. [facility]asudarshan follows the current, evidence-based guidelines published by the [facility] Greg Manuela ([facility]) when recommending preventive services for our patients. Because we follow these guidelines, sometimes recommendations change over time as research supports it. (For example, a prostate screening blood test is no longer routinely recommended for men with no symptoms). Of course, you and your doctor may decide to screen more often for some diseases, based on your risk and co-morbidities (chronic disease you are already diagnosed with). Preventive services for you include: - Medicare offers their members a free annual wellness visit, which is time for you and your primary care provider to discuss and plan for your preventive service needs. Take advantage of this benefit every year! 
-All adults over age 72 should receive the recommended pneumonia vaccines. Current USPSTF guidelines recommend a series of two vaccines for the best pneumonia protection.  
-All adults should have a flu vaccine yearly and tetanus vaccine every 10 years. 
-All adults age 48 and older should receive the shingles vaccines (series of two vaccines).       
-All adults age 38-68 who are overweight should have a diabetes screening test once every three years.  
-Other screening tests & preventive services for persons with diabetes include: an eye exam to screen for diabetic retinopathy, a kidney function test, a foot exam, and stricter control over your cholesterol.  
-Cardiovascular screening for adults with routine risk involves an electrocardiogram (ECG) at intervals determined by the provider.  
-Colorectal cancer screening should be done for adults age 54-65 with no increased risk factors for colorectal cancer. There are a number of acceptable methods of screening for this type of cancer. Each test has its own benefits and drawbacks. Discuss with your provider what is most appropriate for you during your annual wellness visit. The different tests include: colonoscopy (considered the best screening method), a fecal occult blood test, a fecal DNA test, and sigmoidoscopy. 
-All adults born between [facility] should be screened once for Hepatitis C. 
-An Abdominal Aortic Aneurysm (AAA) Screening is recommended for men age 73-68 who has ever smoked in their lifetime. Here is a list of your current Health Maintenance items (your personalized list of preventive services) with a due date: 
Health Maintenance Due Topic Date Due  
 DTaP/Tdap/Td  (1 - Tdap) 06/30/1962  Glaucoma Screening   06/30/2006  Pneumococcal Vaccine (2 of 2 - PPSV23) 09/10/2016  Shingles Vaccine (2 of 2) 12/27/2019 [facility] Annual Well Visit  03/25/2020  Cholesterol Test   03/26/2020

## 2020-08-24 NOTE — PROGRESS NOTES
Identified pt with two pt identifiers(name and ). Reviewed record in preparation for visit and have obtained necessary documentation. Chief Complaint   Patient presents with    Complete Physical        Health Maintenance Due   Topic    DTaP/Tdap/Td series (1 - Tdap)    GLAUCOMA SCREENING Q2Y     Pneumococcal 65+ years (2 of 2 - PPSV23)    Shingrix Vaccine Age 49> (2 of 2)    Medicare Yearly Exam     Lipid Screen        Visit Vitals  Blood Pressure 138/75 (BP 1 Location: Left arm, BP Patient Position: Sitting)   Pulse 65   Temperature 97.9 °F (36.6 °C) (Oral)   Respiration 16   Height 6' (1.829 m)   Weight 209 lb (94.8 kg)   Oxygen Saturation 95%   Body Mass Index 28.35 kg/m²         Coordination of Care Questionnaire:  :   1) Have you been to an emergency room, urgent care, or hospitalized since your last visit? NO      2. Have seen or consulted any other health care provider since your last visit? NO          Patient is accompanied by  I have received verbal consent from Manpreet Albany to discuss any/all medical information while they are present in the room. Doris Lares

## 2020-09-17 ENCOUNTER — HOSPITAL ENCOUNTER (OUTPATIENT)
Dept: GENERAL RADIOLOGY | Age: 80
Discharge: HOME | End: 2020-09-17
Attending: INTERNAL MEDICINE
Payer: MEDICARE

## 2020-09-17 DIAGNOSIS — J92.0 PLEURAL PLAQUE WITH PRESENCE OF ASBESTOS: ICD-10-CM

## 2020-09-17 PROCEDURE — 71046 X-RAY EXAM CHEST 2 VIEWS: CPT

## 2020-12-14 DIAGNOSIS — E78.00 HYPERCHOLESTEREMIA: ICD-10-CM

## 2020-12-15 RX ORDER — SIMVASTATIN 10 MG/1
TABLET, FILM COATED ORAL
Qty: 90 TAB | Refills: 3 | Status: SHIPPED | OUTPATIENT
Start: 2020-12-15 | End: 2020-12-21 | Stop reason: SDUPTHER

## 2020-12-21 DIAGNOSIS — E78.00 HYPERCHOLESTEREMIA: ICD-10-CM

## 2020-12-21 RX ORDER — SIMVASTATIN 10 MG/1
TABLET, FILM COATED ORAL
Qty: 90 TAB | Refills: 3 | Status: SHIPPED | OUTPATIENT
Start: 2020-12-21 | End: 2021-12-30 | Stop reason: SDUPTHER

## 2021-06-22 ENCOUNTER — HOSPITAL ENCOUNTER (OUTPATIENT)
Dept: GENERAL RADIOLOGY | Age: 81
Discharge: HOME | End: 2021-06-22
Attending: NURSE PRACTITIONER
Payer: MEDICARE

## 2021-06-22 ENCOUNTER — OFFICE VISIT (OUTPATIENT)
Dept: FAMILY MEDICINE CLINIC | Age: 81
End: 2021-06-22
Payer: MEDICARE

## 2021-06-22 VITALS
SYSTOLIC BLOOD PRESSURE: 107 MMHG | HEIGHT: 72 IN | TEMPERATURE: 98.2 F | BODY MASS INDEX: 29.12 KG/M2 | HEART RATE: 56 BPM | DIASTOLIC BLOOD PRESSURE: 66 MMHG | RESPIRATION RATE: 16 BRPM | WEIGHT: 215 LBS | OXYGEN SATURATION: 96 %

## 2021-06-22 DIAGNOSIS — M79.672 ACUTE FOOT PAIN, LEFT: ICD-10-CM

## 2021-06-22 DIAGNOSIS — M79.672 ACUTE FOOT PAIN, LEFT: Primary | ICD-10-CM

## 2021-06-22 PROCEDURE — G8752 SYS BP LESS 140: HCPCS | Performed by: NURSE PRACTITIONER

## 2021-06-22 PROCEDURE — G8432 DEP SCR NOT DOC, RNG: HCPCS | Performed by: NURSE PRACTITIONER

## 2021-06-22 PROCEDURE — G8536 NO DOC ELDER MAL SCRN: HCPCS | Performed by: NURSE PRACTITIONER

## 2021-06-22 PROCEDURE — 3288F FALL RISK ASSESSMENT DOCD: CPT | Performed by: NURSE PRACTITIONER

## 2021-06-22 PROCEDURE — 99213 OFFICE O/P EST LOW 20 MIN: CPT | Performed by: NURSE PRACTITIONER

## 2021-06-22 PROCEDURE — 1100F PTFALLS ASSESS-DOCD GE2>/YR: CPT | Performed by: NURSE PRACTITIONER

## 2021-06-22 PROCEDURE — G8754 DIAS BP LESS 90: HCPCS | Performed by: NURSE PRACTITIONER

## 2021-06-22 PROCEDURE — 73630 X-RAY EXAM OF FOOT: CPT

## 2021-06-22 PROCEDURE — G8427 DOCREV CUR MEDS BY ELIG CLIN: HCPCS | Performed by: NURSE PRACTITIONER

## 2021-06-22 PROCEDURE — G8419 CALC BMI OUT NRM PARAM NOF/U: HCPCS | Performed by: NURSE PRACTITIONER

## 2021-06-22 RX ORDER — LANOLIN ALCOHOL/MO/W.PET/CERES
CREAM (GRAM) TOPICAL DAILY
COMMUNITY

## 2021-06-22 NOTE — PROGRESS NOTES
Jorge Hanson is a 79 y.o. male who presents to clinic today for the following:    Chief Complaint   Patient presents with    Other     For x3 weeks; Discuss possible bone spur in left heel       Vitals:    06/22/21 1016   BP: 107/66   Pulse: (!) 56   Resp: 16   Temp: 98.2 °F (36.8 °C)   TempSrc: Temporal   SpO2: 96%   Weight: 215 lb (97.5 kg)   Height: 6' (1.829 m)       Body mass index is 29.16 kg/m². Patients past medical, surgical and family histories were reviewed. Allergies and Medications reviewed and updated. Current Outpatient Medications:     thiamine HCL (Vitamin B-1) 100 mg tablet, Take  by mouth daily. , Disp: , Rfl:     simvastatin (ZOCOR) 10 mg tablet, TAKE 1 TABLET BY MOUTH EVERY DAY AT BEDTIME, Disp: 90 Tab, Rfl: 3    sildenafiL, pulmonary hypertension, (REVATIO) 20 mg tablet, TAKE 1 TO 5 TABLETS BY MOUTH 1 TO 2 HOURS PRIOR TO INTERCOURSE, Disp: 100 Tab, Rfl: 0    omega-3 fatty acids-fish oil 360-1,200 mg cap, 1,200 mg., Disp: , Rfl:     metoprolol succinate (TOPROL-XL) 25 mg XL tablet, TAKE 1 TABLET BY MOUTH ONCE DAILY, Disp: , Rfl:     betamethasone dipropionate (DIPROSONE) 0.05 % topical cream, Apply  to affected area two (2) times a day., Disp: 15 g, Rfl: 2    amLODIPine (NORVASC) 5 mg tablet, TAKE 1 TABLET BY MOUTH ONCE DAILY, Disp: , Rfl:     amiodarone (CORDARONE) 200 mg tablet, TAKE 1 TABLET BY MOUTH ONCE DAILY, Disp: , Rfl:     ascorbic acid, vitamin C, (VITAMIN C) 1,000 mg tablet, 1,000 mg., Disp: , Rfl:     ketoconazole (NIZORAL) 2 % topical cream, APPLY CREAM TOPICALLY TO AFFECTED AREA ONCE DAILY AS NEEDED, Disp: , Rfl:     XARELTO 20 mg tab tablet, TK 1 T PO QD WITH QUINTIN MEAL, Disp: , Rfl: 7    multivitamin (MULTIPLE VITAMINS PO), Take  by mouth., Disp: , Rfl:     potassium chloride SA (MICRO-K) 10 mEq capsule, Take 1 Cap by mouth two (2) times a day., Disp: 180 Cap, Rfl: 1    Allergies   Allergen Reactions    Milk Itching       Past Medical History: Diagnosis Date   [place] [facility]) 2020 Dr. Monik Page    controlled with meds    [facility]) 2020 Dr. Crow Bucio Bladder stone     BPH (benign prostatic hyperplasia) 2020 Dr. Allan Welsh Chronic kidney disease     stage 3    Diverticulosis     Gallstone     HTN (hypertension)     Hyperlipidemia     Lipoma     Osteoarthritis     Pes planus     Renal stones        Past Surgical History:   Procedure Laterality Date    HX COLONOSCOPY  2012    sigmoid diverticulosis, repeat 2022    HX HERNIA REPAIR      HX ORTHOPAEDIC      re-attachment of right middle finger post injury        Family History   Problem Relation Age of Onset    Coronary Artery Disease Mother     Heart Attack Mother     Prostate Cancer Father     Alcohol abuse Brother     Other Brother         colonic polyps       Social History     Socioeconomic History    Marital status:      Spouse name: Not on file    Number of children: Not on file    Years of education: Not on file    Highest education level: Not on file   Occupational History    Not on file   Tobacco Use    Smoking status: Former Smoker     Types: Cigarettes    Smokeless tobacco: Never Used    Tobacco comment: quit >35years ago   Vaping Use    Vaping Use: Never used   Substance and Sexual Activity    Alcohol use: Yes     Comment: socially    Drug use: No    Sexual activity: Yes     Partners: Female   Other Topics Concern    Not on file   Social History Narrative    Not on file     Social Determinants of Health     Financial Resource Strain:     Difficulty of Paying Living Expenses:    Food Insecurity:     Worried About [address] in the Last Year:     [address] in the Last Year:    Transportation Needs:     Lack of Transportation (Medical):      Lack of Transportation (Non-Medical):    Physical Activity:     Days of Exercise per Week:     Minutes of Exercise per Session:    Stress:     Feeling of Stress :    Social Connections:     Frequency of Communication with Friends and Family:     Frequency of Social Gatherings with Friends and Family:     Attends Temple Services:     Active Member of Clubs or Organizations:     Attends Club or Organization Meetings:     Marital Status:    Intimate Partner Violence:     Fear of Current or Ex-Partner:     Emotionally Abused:     Physically Abused:     Sexually Abused:            Physical Exam  Vitals and nursing note reviewed. Constitutional:       Appearance: He is obese. HENT:      Head: Normocephalic. Nose: Nose normal.      Mouth/Throat:      Mouth: Mucous membranes are moist.   Eyes:      Pupils: Pupils are equal, round, and reactive to light. Cardiovascular:      Rate and Rhythm: Normal rate and regular rhythm. Pulses: Normal pulses. Heart sounds: Normal heart sounds. Pulmonary:      Effort: Pulmonary effort is normal.      Breath sounds: Normal breath sounds. Abdominal:      General: Abdomen is flat. Musculoskeletal:         General: Tenderness present. Cervical back: Normal range of motion. Feet:    Feet:      Comments: Foot pain  Skin:     General: Skin is warm. Capillary Refill: Capillary refill takes less than 2 seconds. Neurological:      General: No focal deficit present. Mental Status: He is alert and oriented to person, place, and time. Psychiatric:         Mood and Affect: Mood normal.         Behavior: Behavior normal.          Patient was seen in office with a complaint of left heel pain. He reports this is been ongoing for 2 weeks. Patient states about 20 years ago he had pain in the same area and was a bone spur. He had not had any complications since. He reports that he has never had an x-ray done on that foot. This is been ordered. Advised patient to rest, ice, elevate and take Tylenol as needed. I have given the patient a referral to orthopedics in the event that it is a spur.         Review of Systems Constitutional: Negative for fever and malaise/fatigue. HENT: Negative for congestion, sinus pain and sore throat. Respiratory: Negative for cough and shortness of breath. Cardiovascular: Negative for chest pain. Gastrointestinal: Negative for diarrhea, nausea and vomiting. Genitourinary: Negative for dysuria. Musculoskeletal: Positive for joint pain. Skin: Negative. Neurological: Negative for dizziness and headaches. Endo/Heme/Allergies: Negative for environmental allergies. Psychiatric/Behavioral: Negative. No results found. No results found for this or any previous visit (from the past 24 hour(s)). Assessment and Plan:    Encounter Diagnoses   Name Primary?  Acute foot pain, left Yes                I have discussed the diagnosis with the patient and the intended plan as seen in the above orders. he has expressed understanding. The patient has received an after-visit summary and questions were answered concerning future plans. I have discussed medication side effects and warnings with the patient as well. Follow-up and Dispositions    · Return if symptoms worsen or fail to improve.          Electronically Signed: Baldomero Cannon NP

## 2021-06-22 NOTE — PATIENT INSTRUCTIONS
Please have x ray done today. Go to [phone] for this. Please contact ortho and schedule for evaluation. Ice, rest, elevate foot, return as needed. Foot Pain: Care Instructions Your Care Instructions Foot injuries that cause pain and swelling are fairly common. Almost all sports or home repair projects can cause a misstep that ends up as foot pain. Normal wear and tear, especially as you get older, also can cause foot pain. Most minor foot injuries will heal on their own, and home treatment is usually all you need to do. If you have a severe injury, you may need tests and treatment. Follow-up care is a key part of your treatment and safety. Be sure to make and go to all appointments, and call your doctor if you are having problems. It's also a good idea to know your test results and keep a list of the medicines you take. How can you care for yourself at home? · Take pain medicines exactly as directed. ? If the doctor gave you a prescription medicine for pain, take it as prescribed. ? If you are not taking a prescription pain medicine, ask your doctor if you can take an over-the-counter medicine. · Rest and protect your foot. Take a break from any activity that may cause pain. · Put ice or a cold pack on your foot for 10 to 20 minutes at a time. Put a thin cloth between the ice and your skin. · Prop up the sore foot on a pillow when you ice it or anytime you sit or lie down during the next 3 days. Try to keep it above the level of your heart. This will help reduce swelling. · Your doctor may recommend that you wrap your foot with an elastic bandage. Keep your foot wrapped for as long as your doctor advises. · If your doctor recommends crutches, use them as directed. · Wear roomy footwear. · As soon as pain and swelling end, begin gentle exercises of your foot. Your doctor can tell you which exercises will help. When should you call for help?  
 Call 911 anytime you think you may need emergency care. For example, call if: 
  · Your foot turns pale, white, blue, or cold. Call your doctor now or seek immediate medical care if: 
  · You cannot move or stand on your foot.  
  · Your foot looks twisted or out of its normal position.  
  · Your foot is not stable when you step down.  
  · You have signs of infection, such as: 
? Increased pain, swelling, warmth, or redness. ? Red streaks leading from the sore area. ? Pus draining from a place on your foot. ? A fever.  
  · Your foot is numb or tingly. Watch closely for changes in your health, and be sure to contact your doctor if: 
  · You do not get better as expected.  
  · You have bruises from an injury that last longer than 2 weeks. Where can you learn more? Go to http://latonia-cj.info/ Enter W251 in the search box to learn more about \"Foot Pain: Care Instructions. \" Current as of: November 16, 2020               Content Version: 12.8 © 2006-2021 [employer]. Care instructions adapted under license by [employer] (which disclaims liability or warranty for this information). If you have questions about a medical condition or this instruction, always ask your healthcare professional. [place] [address] any warranty or liability for your use of this information. Heel Pain: Care Instructions Your Care Instructions You can have heel pain from an injury or from everyday overuse, such as running or walking a lot. Plantar fasciitis is the most common cause of heel pain. In this condition, the bottom of your foot from the front of the heel to the base of the toes is sore and hard to walk on. Your heel can get better with rest, anti-inflammatory pain medicines, and stretching exercises. Follow-up care is a key part of your treatment and safety. Be sure to make and go to all appointments, and call your doctor if you are having problems.  It's also a good idea to know your test results and keep a list of the medicines you take. How can you care for yourself at home? · Rest your feet often. Reduce your activity to a level that lets you avoid pain. If possible, do not run or walk on hard surfaces. · Take anti-inflammatory medicines to reduce heel pain. These include ibuprofen (Advil, Motrin) and naproxen (Aleve). Read and follow all instructions on the label. · Put ice or a cold pack on your heel for 10 to 20 minutes at a time. Try to do this every 1 to 2 hours for the next 3 days (when you are awake). Put a thin cloth between the ice and your skin. · If ice isn't helping after 2 or 3 days, try heat, such as a heating pad set on low. · If your doctor says it is okay, try these calf stretches. Tight calf muscles can cause heel pain or make it worse. ? Stand about 1 foot from a wall. Place the palms of both hands against the wall at chest level and lean forward against the wall. Put the leg you want to stretch about a step behind your other leg. Keep your back heel on the floor and bend your front knee until you feel a stretch in the back leg. Hold this position for 15 to 30 seconds. Repeat the exercise 2 to 4 times a session. Do 3 to 4 sessions a day. ? Sit down on the floor or a mat with your feet stretched in front of you. Roll up a towel lengthwise, and loop it over the ball of your foot. Holding the towel at both ends, gently pull the towel toward you to stretch your foot. Hold this position for 15 to 30 seconds. Repeat the exercise 2 to 4 times a session. Do 3 to 4 sessions a day. · Wear a night splint if your doctor suggests it. A night splint holds your foot with the toes pointed up. This position gives the bottom of your foot a constant, gentle stretch. · Wear shoes with good arch support. Athletic shoes or shoes with a well-cushioned sole are good choices. · Try a heel lift, heel cup or shoe insert (orthotic) to help cushion your heel.  You can buy these at many shoe stores. Use them in both shoes, even if only one foot hurts. · Maintain a healthy weight. This puts less strain on your feet. When should you call for help? Call your doctor now or seek immediate medical care if: 
  · You have heel pain with fever, redness, or warmth in your heel.  
  · You have numbness or tingling in your heel. Watch closely for changes in your health, and be sure to contact your doctor if: 
  · You cannot put weight on the sore foot.  
  · Your heel pain lasts more than 2 weeks. Where can you learn more? Go to http://www.gray.com/ Enter S299 in the search box to learn more about \"Heel Pain: Care Instructions. \" Current as of: February 26, 2020               Content Version: 12.8 © 2006-2021 [employer]. Care instructions adapted under license by [employer] (which disclaims liability or warranty for this information). If you have questions about a medical condition or this instruction, always ask your healthcare professional. [place] [address] any warranty or liability for your use of this information. Plantar Fasciitis: Care Instructions Overview Plantar fasciitis is pain and inflammation of the plantar fascia, the tissue at the bottom of your foot that connects the heel bone to the toes. The plantar fascia also supports the arch. If you strain the plantar fascia, it can develop small tears and cause heel pain when you stand or walk. Plantar fasciitis can be caused by running or other sports. It also may occur in people who are overweight or who have high arches or flat feet. You may get plantar fasciitis if you walk or stand for long periods, or have a tight Achilles tendon or calf muscles. You can improve your foot pain with rest and other care at home. It might take a few weeks to a few months for your foot to heal completely. Follow-up care is a key part of your treatment and safety.  Be sure to make and go to all appointments, and call your doctor if you are having problems. It's also a good idea to know your test results and keep a list of the medicines you take. How can you care for yourself at home? · Rest your feet often. Reduce your activity to a level that lets you avoid pain. If possible, do not run or walk on hard surfaces. · Take pain medicines exactly as directed. ? If the doctor gave you a prescription medicine for pain, take it as prescribed. ? If you are not taking a prescription pain medicine, take an over-the-counter anti-inflammatory medicine for pain and swelling, such as ibuprofen (Advil, Motrin) or naproxen (Aleve). Read and follow all instructions on the label. · Use ice massage to help with pain and swelling. You can use an ice cube or an ice cup several times a day. To make an ice cup, fill a paper cup with water and freeze it. Cut off the top of the cup until a half-inch of ice shows. Hold onto the remaining paper to use the cup. Rub the ice in small circles over the area for 5 to 7 minutes. · Contrast baths, which alternate hot and cold water, can also help reduce swelling. But because heat alone may make pain and swelling worse, end a contrast bath with a soak in cold water. · Wear a night splint if your doctor suggests it. A night splint holds your foot with the toes pointed up and the foot and ankle at a 90-degree angle. This position gives the bottom of your foot a constant, gentle stretch. · Do simple exercises such as calf stretches and towel stretches 2 to 3 times each day, especially when you first get up in the morning. These can help the plantar fascia become more flexible. They also make the muscles that support your arch stronger. Hold these stretches for 15 to 30 seconds per stretch. Repeat 2 to 4 times. ? Stand about 1 foot from a wall. Place the palms of both hands against the wall at chest level.  Lean forward against the wall, keeping one leg with the knee straight and heel on the ground while bending the knee of the other leg. 
? Sit down on the floor or a mat with your feet stretched in front of you. Roll up a towel lengthwise, and loop it over the ball of your foot. Holding the towel at both ends, gently pull the towel toward you to stretch your foot. · Wear shoes with good arch support. Athletic shoes or shoes with a well-cushioned sole are good choices. · Replace athletic shoes regularly. · Try heel cups or shoe inserts (orthotics) to help cushion your heel. You can buy these at many shoe stores. · Put on your shoes as soon as you get out of bed. Going barefoot or wearing slippers may make your pain worse. · Reach and stay at a good weight for your height. This puts less strain on your feet. When should you call for help? Call your doctor now or seek immediate medical care if: 
  · You have heel pain with fever, redness, or warmth in your heel.  
  · You cannot put weight on the sore foot. Watch closely for changes in your health, and be sure to contact your doctor if: 
  · You have numbness or tingling in your heel.  
  · Your heel pain lasts more than 2 weeks. Where can you learn more? Go to http://www.[employer]/ Daniel Cotton in the search box to learn more about \"Plantar Fasciitis: Care Instructions. \" Current as of: November 16, 2020               Content Version: 12.8 © 2006-2021 [employer]. Care instructions adapted under license by [employer] (which disclaims liability or warranty for this information). If you have questions about a medical condition or this instruction, always ask your healthcare professional. [place] [address] any warranty or liability for your use of this information. Bone Spur Repair: Before Your Surgery What is a bone spur repair? A bone spur repair is surgery to remove a bone spur, a bony growth that forms on normal bone.  Your doctor will make one or more small cuts near the bone spur. These cuts are called incisions. Then the doctor will use small tools to remove the piece of bone. You may have arthroscopic surgery. It is done using a few small incisions and a lighted viewing tube called an arthroscope. Or the doctor may make one larger incision. This is called open surgery. After surgery, you may have less pain. If your bone spur is in a joint, the joint may move more easily. Removing a bone spur may also help prevent future problems. You will probably go home on the day of surgery or the next day. It depends on your general health and on what kind of surgery you had. The time it takes to go back to work or your normal routine depends on the type of surgery you had, the type of work you do, and how active you are. Follow-up care is a key part of your treatment and safety. Be sure to make and go to all appointments, and call your doctor if you are having problems. It's also a good idea to know your test results and keep a list of the medicines you take. How do you prepare for surgery? Surgery can be stressful. This information will help you understand what you can expect. And it will help you safely prepare for surgery. Preparing for surgery 
  · Be sure you have someone to take you home. Anesthesia and pain medicine will make it unsafe for you to drive or get home on your own.  
  · Understand exactly what surgery is planned, along with the risks, benefits, and other options.  
  · If you take aspirin or some other blood thinner, ask your doctor if you should stop taking it before your surgery. Make sure that you understand exactly what your doctor wants you to do. These medicines increase the risk of bleeding.  
  · Tell your doctor ALL the medicines, vitamins, supplements, and herbal remedies you take. Some may increase the risk of problems during your surgery.  Your doctor will tell you if you should stop taking any of them before the surgery and how soon to do it.  
  · Make sure your doctor and the hospital have a copy of your advance directive. If you don't have one, you may want to prepare one. It lets others know your health care wishes. It's a good thing to have before any type of surgery or procedure. What happens on the day of surgery? 
  · Follow the instructions exactly about when to stop eating and drinking. If you don't, your surgery may be canceled. If your doctor told you to take your medicines on the day of surgery, take them with only a sip of water.  
  · Follow your doctor's instructions about when to bathe or shower before your surgery. Do not apply lotions, perfumes, deodorants, or nail polish.  
  · Do not shave the surgical site yourself.  
  · Take off all jewelry and piercings. And take out contact lenses, if you wear them.  
  · Wear clothing that is easy to put on and take off. You may have a large bandage over the surgery area. At the hospital or surgery center 
  · Bring a picture ID.  
  · The area for surgery is often marked to make sure there are no errors.  
  · You will be kept comfortable and safe by your anesthesia provider. The anesthesia may make you sleep. Or it may just numb the area being worked on.  
  · The surgery will take about 1 to 2 hours. When should you call your doctor? · You have questions or concerns.  
  · You don't understand how to prepare for your surgery.  
  · You become ill before the surgery (such as fever, flu, or a cold).  
  · You need to reschedule or have changed your mind about having the surgery. Where can you learn more? Go to http://www.gray.com/ Enter K960 in the search box to learn more about \"Bone Spur Repair: Before Your Surgery. \" Current as of: November 16, 2020               Content Version: 12.8 © 4280-2325 [employer].   
Care instructions adapted under license by [employer] (which disclaims liability or warranty for this information). If you have questions about a medical condition or this instruction, always ask your healthcare professional. [place] [address] any warranty or liability for your use of this information.

## 2021-06-22 NOTE — PROGRESS NOTES
Identified pt with two pt identifiers(name and ). Reviewed record in preparation for visit and have obtained necessary documentation. Chief Complaint   Patient presents with    Other     For x3 weeks; Discuss possible bone spur in left heel        Health Maintenance Due   Topic    Hepatitis C Screening     COVID-19 Vaccine (1)    DTaP/Tdap/Td series (1 - Tdap)    Lipid Screen        Coordination of Care Questionnaire:  :   1) Have you been to an emergency room, urgent care, or hospitalized since your last visit? If yes, where when, and reason for visit? no      2. Have seen or consulted any other health care provider since your last visit? If yes, where when, and reason for visit?  no        Patient is accompanied by self I have received verbal consent from Fritz Infante to discuss any/all medical information while they are present in the room.

## 2021-06-23 ENCOUNTER — TELEPHONE (OUTPATIENT)
Dept: FAMILY MEDICINE CLINIC | Age: 81
End: 2021-06-23

## 2021-06-23 NOTE — TELEPHONE ENCOUNTER
----- Message from Marylou Miller NP sent at 6/23/2021  9:08 AM EDT -----  There is a large spur in the area you have pain.   Please rest, use supporting and soft shoes and follow up with Orthopedic referral.

## 2021-06-23 NOTE — PROGRESS NOTES
There is a large spur in the area you have pain.   Please rest, use supporting and soft shoes and follow up with Orthopedic referral.

## 2021-06-25 ENCOUNTER — TELEPHONE (OUTPATIENT)
Dept: FAMILY MEDICINE CLINIC | Age: 81
End: 2021-06-25

## 2021-06-25 NOTE — TELEPHONE ENCOUNTER
----- Message from Felipe Paris NP sent at 6/23/2021  9:08 AM EDT -----  There is a large spur in the area you have pain.   Please rest, use supporting and soft shoes and follow up with Orthopedic referral.

## 2021-11-09 ENCOUNTER — HOSPITAL ENCOUNTER (OUTPATIENT)
Dept: GENERAL RADIOLOGY | Age: 81
Discharge: HOME | End: 2021-11-09
Attending: INTERNAL MEDICINE
Payer: MEDICARE

## 2021-11-09 ENCOUNTER — TRANSCRIBE ORDER (OUTPATIENT)
Dept: MRI IMAGING | Age: 81
End: 2021-11-09

## 2021-11-09 DIAGNOSIS — J92.0 PLEURAL PLAQUE WITH PRESENCE OF ASBESTOS: Primary | ICD-10-CM

## 2021-11-09 DIAGNOSIS — J92.0 PLEURAL PLAQUE WITH PRESENCE OF ASBESTOS: ICD-10-CM

## 2021-11-09 PROCEDURE — 71046 X-RAY EXAM CHEST 2 VIEWS: CPT

## 2021-11-24 LAB — SARS-COV-2, NAA: NEGATIVE

## 2021-12-30 DIAGNOSIS — E78.00 HYPERCHOLESTEREMIA: ICD-10-CM

## 2021-12-30 RX ORDER — SIMVASTATIN 10 MG/1
TABLET, FILM COATED ORAL
Qty: 90 TABLET | Refills: 3 | Status: SHIPPED | OUTPATIENT
Start: 2021-12-30

## 2021-12-30 NOTE — TELEPHONE ENCOUNTER
PCP: Javad Chowdary MD    Last appt: 6/22/2021  No future appointments.     Requested Prescriptions     Pending Prescriptions Disp Refills    simvastatin (ZOCOR) 10 mg tablet 90 Tablet 3     Sig: TAKE 1 TABLET BY MOUTH EVERY DAY AT BEDTIME       Prior labs and Blood pressures:  BP Readings from Last 3 Encounters:   06/22/21 107/66   08/24/20 138/75   03/25/19 118/80     Lab Results   Component Value Date/Time    Sodium 144 03/26/2019 08:21 AM    Potassium 3.7 03/26/2019 08:21 AM    Chloride 102 03/26/2019 08:21 AM    CO2 25 03/26/2019 08:21 AM    Glucose 98 03/26/2019 08:21 AM    BUN 20 03/26/2019 08:21 AM    Creatinine 1.21 03/26/2019 08:21 AM    BUN/Creatinine ratio 17 03/26/2019 08:21 AM    GFR est AA 66 03/26/2019 08:21 AM    GFR est non-AA 57 (L) 03/26/2019 08:21 AM    Calcium 9.3 03/26/2019 08:21 AM     Lab Results   Component Value Date/Time    Hemoglobin A1c 5.3 03/26/2019 08:21 AM     Lab Results   Component Value Date/Time    Cholesterol, total 106 03/26/2019 08:21 AM    HDL Cholesterol 33 (L) 03/26/2019 08:21 AM    LDL, calculated 57 03/26/2019 08:21 AM    VLDL, calculated 16 03/26/2019 08:21 AM    Triglyceride 79 03/26/2019 08:21 AM     No results found for: VITD3, XQVID2, XQVID3, XQVID, VD3RIA    No results found for: TSH, TSH2, TSH3, TSHP, TSHEXT

## 2022-03-18 PROBLEM — N52.9 ERECTILE DYSFUNCTION: Status: ACTIVE | Noted: 2019-03-25

## 2022-03-19 PROBLEM — N18.30 CKD (CHRONIC KIDNEY DISEASE) STAGE 3, GFR 30-59 ML/MIN (HCC): Status: ACTIVE | Noted: 2019-03-25

## 2022-03-19 PROBLEM — I48.20 CHRONIC ATRIAL FIBRILLATION (HCC): Status: ACTIVE | Noted: 2019-03-25

## 2022-03-19 PROBLEM — E78.5 DYSLIPIDEMIA: Status: ACTIVE | Noted: 2019-03-25

## 2022-03-19 PROBLEM — J61 ASBESTOSIS (HCC): Status: ACTIVE | Noted: 2019-03-25
